# Patient Record
Sex: MALE | Race: OTHER | URBAN - METROPOLITAN AREA
[De-identification: names, ages, dates, MRNs, and addresses within clinical notes are randomized per-mention and may not be internally consistent; named-entity substitution may affect disease eponyms.]

---

## 2021-08-10 ENCOUNTER — OFFICE VISIT (OUTPATIENT)
Dept: URBAN - METROPOLITAN AREA CLINIC 98 | Facility: CLINIC | Age: 56
End: 2021-08-10

## 2021-10-05 ENCOUNTER — DASHBOARD ENCOUNTERS (OUTPATIENT)
Age: 56
End: 2021-10-05

## 2021-10-05 ENCOUNTER — OFFICE VISIT (OUTPATIENT)
Dept: URBAN - METROPOLITAN AREA CLINIC 98 | Facility: CLINIC | Age: 56
End: 2021-10-05
Payer: COMMERCIAL

## 2021-10-05 VITALS
SYSTOLIC BLOOD PRESSURE: 148 MMHG | BODY MASS INDEX: 24.27 KG/M2 | HEART RATE: 68 BPM | WEIGHT: 154.6 LBS | HEIGHT: 67 IN | DIASTOLIC BLOOD PRESSURE: 84 MMHG | TEMPERATURE: 97.5 F

## 2021-10-05 DIAGNOSIS — K21.9 GERD WITHOUT ESOPHAGITIS: ICD-10-CM

## 2021-10-05 PROCEDURE — 99214 OFFICE O/P EST MOD 30 MIN: CPT | Performed by: INTERNAL MEDICINE

## 2021-10-05 NOTE — HPI-TODAY'S VISIT:
Patient referred by J Carlos WANG MD for evaluation of chronic GERD. Copy of this consult OV sent to Dr. Wang. 54 yo  w/ chronic GERD /wo alarm nor extraesophageal sxs, w/ occasional breakthrough sxs, triggered by certain diet items. When on an antireflux diet, sxs are rather well controlled w/ Famotidine qd. No abdominal pain, nocturnal sxs nor dysphagia / odynophagia. No anorexia or weight loss. Denies cardiorespratory sxs.  Normal CPE 4 mos ago. No COVID-19 exposure and has received 2 doses of COVID-19 vaccine.  No other complaints.

## 2021-10-08 PROBLEM — 266435005: Status: ACTIVE | Noted: 2021-10-05

## 2021-10-28 ENCOUNTER — OFFICE VISIT (OUTPATIENT)
Dept: URBAN - METROPOLITAN AREA SURGERY CENTER 18 | Facility: SURGERY CENTER | Age: 56
End: 2021-10-28